# Patient Record
(demographics unavailable — no encounter records)

---

## 2024-10-31 NOTE — ASSESSMENT
[FreeTextEntry1] : Diagnosis BPH Retention  Plan Discussed finasteride vs surgery Plan for repeat PSA, MRI  Then return for discussion of outlet procedures  Kenneth Garcia MD, FACS, FRCS  of Urology [facility] Director of Laparoscopic and Robotic Surgery [facility] Director of Urology, [facility] Professor of Urology   (Office) [phone] (Cell)  [phone] [email or web address]  The total amount of time I have personally spent preparing for this visit, reviewing the patient's test results, obtaining external history, ordering tests/medications, documenting clinical information, communicating with and counseling the patient/family and/or caregiver(s), and spent face to face with the patient explaining the above was minutes =40

## 2024-10-31 NOTE — HISTORY OF PRESENT ILLNESS
[FreeTextEntry1] : CC: urinary retention  Patient had "routine testing" and found to have PSA 6.1 ng/ml, then US showed PVR of 610 (prevoid 1086, ).  Had a catheter fore 5 days then removed.   Now says stream good in morning, then slow, "not that strong".   UA negative  Cr 1.05  US demonstrated  and prostate volume 139, and mild bilateral hydronephrosis   PVR today between 200-300 cc

## 2024-11-07 NOTE — HISTORY OF PRESENT ILLNESS
[FreeTextEntry1] : seen 10/31 by LR for urinary retention and elev psa psa was 6.1. lr repeated 6.1 had cath x 5 days then removed. strem good then weakens thorught the day cr 1.05 us demonstratd pvr 610 and volume 139cc with mild bilatreal hydro pvr on 10/31 was 200-300  they dsicuss fiansteride vs surgery repeated psa- confirmed mri returns to discuss outlet procedures   on meds fo rprostate? mri read= psad= prior bx fh pvr today 4cc sono to reeval hydro options= simple, holep, pae

## 2024-11-14 NOTE — HISTORY OF PRESENT ILLNESS
[facility]   Advanced Heart Failure Clinic Note    Date of visit: 12/6/2023  Patient Name: Xu Flores  Medical Record Number: [id number]  YOB: 1941   Primary Physician: Richard Angeles MD.     SUBJECTIVE  Xu Flores is a 82 year old woman with PMHx of  heart failure with reduced ejection fraction (ischemic cardiomyopathy, LVEF 30% s/p ICD) w/ most recent EF 42%, severe MR (improved to M-M), HTN, CAD s/p PCI of the LAD (x2) and LPL (x1), type II DM, CKD, history of gastric ulcers and GI bleed, MALT lymphoma, obstructive sleep apnea not on CPAP, morbid obesity, depression and anxiety, CardioMEMS implant on 5/5/2022 for optimization of therapy, who presents today for follow-up.    She is accompanied today.     Xu Flores is doing alright today and reports some LE edema.    She denies any chest pain, chest pressure, shortness of breath, orthopnea, paroxysmal nocturnal dyspnea.    Pt denies dizziness, lightheadedness, and weight gain. No claudication, presyncope, syncope, changes in appetite, early satiety, nausea/emesis, or abdominal bloating.     No recent ER visits or hospitalizations.      Functional status is not significantly changed from prior visit.    She is adherent with medications and denies any side effects.     She lives at home.    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    Localized swelling, mass and lump, neck 12/06/2018     Malignant neoplasm (CMD)     right breast CA    Oral thrush 08/18/2023    Pain of right hip 06/13/2023    Presence of biventricular automatic cardioverter/defibrillator (AICD)     PUD (peptic ulcer disease)     Severe mitral regurgitation 02/23/2022    Sleep apnea     no cpap       Past Surgical History:  Past Surgical History:   Procedure Laterality Date    Breast lumpectomy Right     Cataract extraction, bilateral      Cholecystectomy      Icd implant      Ptca      4 vessels    Total hip replacement Right 06/09/2023    with I & D, scar revision 07/24/2023    Total knee replacement Right     Tubal ligation         Past Family History:  Family History   Problem Relation Age of Onset    Diabetes Mother     Diabetes Father        Past Social History:  Social History     Tobacco Use    Smoking status: Never     Passive exposure: Never    Smokeless tobacco: Never   Vaping Use    Vaping Use: never used   Substance Use Topics    Alcohol use: Never    Drug use: Never       Medications:  No outpatient medications have been marked as taking for the 12/6/23 encounter (Appointment) with Carter Murillo MD.       Allergies:  ALLERGIES:  No Known Allergies    General Review of Systems:    Constitutional: No fatigue. No unexpected weight change.   HENT: No nosebleeds.    Respiratory: As above  Cardiovascular: As above  Gastrointestinal: No for abdominal distention. No abdominal pain.   Endocrine: No for polyuria.   Genitourinary: No for hematuria.   Musculoskeletal: No myalgias.   Skin: No pallor.   Neurological: No syncope. No light-headedness.   Hematological: Does not bruise/bleed easily.   Otherwise complete ROS is negative.     OBJECTIVE  Vitals:    There were no vitals taken for this visit.     Wt Readings from Last 4 Encounters:   12/06/23 85.1 kg (187 lb 9.8 oz)   11/15/23 83.5 kg (184 lb)   11/15/23 83.2 kg (183 lb 6.8 oz)   10/11/23 84.7 kg (186 lb 11.7 oz)       Devices:  BIV (biventricular) - ICD and  CardioMEMs    Physical Exam:  Constitutional: 82 year old woman is in no acute distress.  Skin: Warm, dry, intact, no lesions.  HEENT: Normocephalic, atraumatic. Oral mucous membranes moist, EOMs (extraocular movements) intact.  Neck: Supple, trachea midline, JVP is normal, negative HJR (hepatojugular reflux).  Cardiovascular: Normal S1, S2. regular rhythm. Murmur: none. negative S3 and S4.  Respiratory: Clear  to auscultation bilaterally.   Abdomen: negative distension. Soft, nontender, negative hepatomegaly and normal bowel sounds.  Musculoskeletal/Extremities: CRT (capillary refill time is <3). No clubbing, no cyanosis. Edema: minimal edema.  Neurological: No focal neurological deficits and speech normal. Sensation grossly intact.  Psychiatric: Alert and oriented to person, place and time.    Labs:  Lab Results   Component Value Date    POTASSIUM 4.8 08/31/2023    SODIUM 138 08/31/2023    CO2 31 08/31/2023    CHLORIDE 101 08/31/2023    BUN 46 (H) 08/31/2023    CREATININE 1.35 (H) 08/31/2023    GLUCOSE 86 08/31/2023    CALCIUM 9.4 08/31/2023    NTPROB 1,100 (H) 03/03/2023     Lab Results   Component Value Date    WBC 11.0 08/31/2023    HCT 31.9 (L) 08/31/2023    HGB 9.4 (L) 08/31/2023     08/31/2023     Lab Results   Component Value Date    NTPROB 1,100 (H) 03/03/2023    NTPROB 704 (H) 01/05/2023     Lab Results   Component Value Date    CREATININE 1.35 (H) 08/31/2023    CREATININE 1.5 (H) 08/07/2023   ''    I personally reviewed these labs above.    Diagnostic Test:  Encounter Date: 08/11/23   Electrocardiogram 12-Lead   Result Value    Ventricular Rate EKG/Min (BPM) 66    Atrial Rate (BPM) 66    ND-Interval (MSEC) 216    QRS-Interval (MSEC) 160    QT-Interval (MSEC) 454    QTc 476    P Axis (Degrees) 36    R Axis (Degrees) -41    T Axis (Degrees) -33    REPORT TEXT      Sinus rhythm  with sinus arrhythmia  with 1st degree AV block  Left axis deviation  Left bundle branch block  Abnormal ECG  When compared  [FreeTextEntry1] : seen 10/31 by LR for urinary retention and elev psa psa was 6.1. lr repeated 6.1 had cath x 5 days then removed. strem good then weakens thorught the day cr 1.05 us demonstratd pvr 610 and volume 139cc with mild bilatreal hydro pvr on 10/31 was 200-300  they dsicuss fiansteride vs surgery repeated psa- confirmed mri returns to discuss outlet procedures   on meds fo rprostate? mri read= psad= prior bx fh pvr today 4cc sono to reeval hydro options= simple, holep, pae  ************* 11/13/24: 73 yo male referred for prostate biopsy for recent MRI showing two PIARDS 4 lesions in the setting for elevated PSA.  He has a hx of BPH with urinary retention, but his OVRs more recently have been minimal.   MRI prostate (11/5/24): 112 cc prostate (PSAD = 0.06) with two PIRADs 4 lesions; right mid PZPM, and left mid PZPM PSA (10/31/24) = 6.16  with ECG of  2023 04:19,  MD interval  has increased  QRS duration  has increased  Nonspecific T wave abnormality no longer evident in  Anterior leads  Confirmed by ISSAC MCGHEE [facility] ([id number]) on 2023 4:01:20 PM         Echocardiogram:  Results for orders placed during the hospital encounter of 23    TRANSTHORACIC ECHO (TTE) COMPLETE W/ W/O IMAGING AGENT    Narrative  *[facility]*  [address] [place] [id number]  Phone [phone]  Fax [phone]  Transthoracic Echocardiogram (TTE)    Patient: Xu Flores     Study Date/Time:         2023 9:29AM  MRN:     [id number]            FIN#:                    [id number]  :     1941         Ht/Wt:                   147cm 94.4kg  Age:     81                 BSA/BMI:                 2.02m^2 43.7kg/m^2  Gender:  F                  Baseline BP:             158 / 79  Ordering Physician:   Hernan Rowan    Referring Physician:  Hernan Rowan Gabriel Steven    Attending Physician:  Josh Thibodeaux  Performing Physician: Dennis Zamora MD  Diagnostic Physician: Dennis Zamora MD  Sonographer:          ADILIA Arvizu    ------------------------------------------------------------------------------  INDICATIONS:  Troponin i increased in blood specimen.    ------------------------------------------------------------------------------  STUDY CONCLUSIONS  SUMMARY:    1. Left ventricle: The cavity size is normal. Wall thickness is normal.  Systolic function is reduced. The ejection fraction was measured by 3D  assessment. The ejection fraction is 48%.  2. Right ventricle: The cavity size is normal. Systolic function is normal.  Pacemaker lead noted in right ventricle.  3. Right atrium: Pacemaker lead noted in right atrium.  IMPRESSIONS:  Overall unremarkable study. When compared to her prior Echo on  2022 there is slight improvement in overall LV systolic function. The  estimated  pulmonary systiolic pressures are slightly higher. There is no  evidence of high LV filling pressures.    ------------------------------------------------------------------------------  STUDY DATA:   Procedure:  A transthoracic echocardiogram was performed. Image  quality was adequate. The study was technically limited. Intravenous contrast  (Definity 2ml) was administered to opacify the left ventricle.  M-mode,  complete 2D, 3D, complete spectral Doppler, color Doppler, and intravenous  contrast injection.  Study status:  Routine.  Study completion:  There were no  complications.    FINDINGS    LEFT VENTRICLE:  3D imaging and post-processing assessment performed. Well  visualized. The cavity size is normal. Wall thickness is normal. There is no  evidence of hypertrophy. Systolic function is reduced.    The ejection  fraction was measured by 3D assessment. The ejection fraction is 48%. The  tissue Doppler parameters are abnormal. Diastolic dysfunction present but  unable to assess severity. There is no evidence of elevated ventricular  filling pressure by Doppler parameters.    AORTIC VALVE:  Poorly visualized. The annulus is mildly to moderately  calcified. The valve is probably trileaflet. The leaflets are mildly thickened  and mildly calcified. Cusp separation is normal. Mobility is not restricted.  Velocity is minimally increased. There is no stenosis. The peak systolic  gradient is 15mm Hg. The ratio of LVOT to aortic valve peak velocity is 0.45.  The valve area is 1.0cm^2. The valve area index is 0.5cm^2/m^2.    AORTA:  Aortic root: The root is normal-sized.  Ascending aorta: The vessel is normal-sized.  Descending aorta: The vessel is normal-sized.    MITRAL VALVE:  Well visualized. The annulus is mildly to moderately calcified.  The leaflets are normal thickness. Leaflet separation is normal. Mobility is  not restricted. No evidence for prolapse. There is nothing to suggest chordal  rupture or a flail  leaflet. Inflow velocity is within the normal range. There  is no evidence for stenosis. There is mild regurgitation. The mean diastolic  gradient is 3mm Hg. The peak diastolic gradient is 3mm Hg. The valve area by  pressure half-time is 3.3cm^2. The valve area index by pressure half-time is  1.65cm^2/m^2. The valve area (LVOT continuity) is 1.1cm^2. The valve area  index (LVOT continuity) is 0.56cm^2/m^2.    ATRIAL SEPTUM:  Well visualized. The septum is normal.  Color doppler shows no  obvious shunt.    LEFT ATRIUM:  Well visualized. The atrium is normal in size.    RIGHT VENTRICLE:  The cavity size is normal. Systolic function is normal. The  TAPSE is normal, suggestive of normal RV systolic function.  Systolic pressure  is increased. The estimated peak pressure is 35mm Hg. Pacemaker lead noted in  right ventricle.       The RV pressure during systole is 36mm Hg.    VENTRICULAR SEPTUM:   Well visualized. Thickness is normal.  Abnormal septal  motion. There is \"septal bounce\".    PULMONIC VALVE:   Not well visualized. There is no significant regurgitation.  The peak systolic gradient is 3mm Hg.    TRICUSPID VALVE:  Well visualized. There is no significant regurgitation.    RIGHT ATRIUM:  Well visualized. The atrium is normal in size. Pacemaker lead  noted in right atrium.       The estimated central venous pressure is 3mm Hg.    PERICARDIUM:  The pericardium is normal in appearance.  There is no  pericardial effusion.   No evidence of pleural fluid accumulation.    SYSTEMIC VEINS:  Inferior vena cava: The IVC is dilated.  Respirophasic diameter changes are in  the normal range (>= 50%).    ------------------------------------------------------------------------------  Measurements    Left ventricle            Value             Ref       02/28/2022  Left atrium continued      Value          Ref       02/28/2022  BEN, LAX chord        (H) 5.9   cm          3.8 - 5.2 ----------  Area/bsa ES, A2C           10.03  cm^2/m^2 --------- ----------  ESD, LAX chord        (H) 5.2   cm          2.2 - 3.5 ----------  Vol/bsa, S             (N) 23    ml/m^2   16 - 34   ----------  BEN/bsa, LAX chord    (N) 2.9   cm/m^2      2.3 - 3.1 ----------  Vol, ES, 1-p A4C       (N) 35    ml       22 - 52   42  ESD/bsa, LAX chord    (H) 2.6   cm/m^2      1.3 - 2.1 ----------  Vol/bsa, ES, 1-p A4C   (N) 17    ml/m^2   11 - 40   24  PW, ED, LAX           (N) 0.7   cm          0.6 - 0.9 ----------  Vol, ES, 1-p A2C       (H) 61    ml       22 - 52   68  BEN major ax, A4C         9.5   cm          --------- ----------  Vol/bsa, ES, 1-p A2C   (N) 30    ml/m^2   13 - 40   38  ESD major ax, A4C         8.7   cm          --------- ----------  Vol, ES, 2-p               49    ml       --------- 57  FS major axis, A4C        8     %           --------- ----------  Vol/bsa, ES, 2-p       (N) 24    ml/m^2   16 - 34   32  BEN/bsa major ax, A4C     4.7   cm/m^2      --------- ----------  ESD/bsa major ax, A4C     4.3   cm/m^2      --------- ----------  Aortic valve               Value          Ref       02/28/2022  ANDREW, A4C                  45.0  cm^2        --------- ----------  Peak v, S                  1.9   m/sec    --------- ----------  PRISCILA, A4C                  33.7  cm^2        --------- ----------  Peak grad, S               15    mm Hg    --------- ----------  FAC, A4C                  25    %           --------- ----------  LVOT/AV, Vpeak ratio       0.45           --------- ----------  IVS, ED               (N) 0.7   cm          0.6 - 0.9 ----------  JENNY, Vmax                  1.0   cm^2     --------- ----------  PW, ED                (N) 0.7   cm          0.6 - 0.9 ----------  JENNY/bsa, Vmax              0.5   cm^2/m^2 --------- ----------  IVS/PW, ED                0.91              --------- ----------  EDV                   (H) 202   ml          46 - 106  ----------  Mitral valve               Value          Ref       02/28/2022  ESV                    (H) 138   ml          14 - 42   ----------  Mean v, D                  0.7   m/sec    --------- ----------  EF                    (L) 48    %           54 - 74   42          Peak E                     0.89  m/sec    --------- 0.8  SV                        43    ml          --------- 53          Peak A                     1.23  m/sec    --------- 1.1  EDV/bsa               (H) 100   ml/m^2      29 - 61   ----------  VTI leaflet coapt          34.8  cm       --------- ----------  ESV/bsa               (H) 68    ml/m^2      8 - 24    ----------  MiV/LVOT VTI               2.0            --------- ----------  SV/bsa                    21    ml/m^2      --------- 30          Decel time                 225   ms       --------- 209  SV, 1-p A4C               60    ml          --------- ----------  PHT                        66    ms       --------- ----------  SV/bsa, 1-p A4C           30    ml/m^2      --------- ----------  Mean grad, D               3     mm Hg    --------- ----------  EDV, 2-p              (H) 171   ml          46 - 106  ----------  Peak grad, D               3     mm Hg    --------- 3  ESV, 2-p              (H) 114   ml          14 - 42   ----------  Peak E/A ratio             0.7            --------- ----------  SV, 2-p                   57    ml          --------- ----------  A-VTI                      24.4  cm       --------- ----------  EDV/bsa, 2-p          (H) 85    ml/m^2      29 - 61   ----------  MVA, PHT                   3.3   cm^2     --------- ----------  ESV/bsa, 2-p          (H) 56    ml/m^2      8 - 24    ----------  MVA/bsa, PHT               1.65  cm^2/m^2 --------- ----------  SV/bsa, 2-p               28.2  ml/m^2      --------- ----------  MVA, LVOT cont             1.1   cm^2     --------- ----------  E', lat crispin, TDI      (L) 6.2   cm/sec      >=10.0    ----------  MVA/bsa, LVOT cont         0.56  cm^2/m^2 --------- ----------  E/e', lat crispin, TDI    (H) 14                 <=13      ----------  E', med crispin, TDI      (N) 8.6   cm/sec      >=7.0     ----------  Pulmonic valve             Value          Ref       02/28/2022  E/e', med crispin, TDI        10                --------- ----------  Peak v, S                  0.9   m/sec    --------- 0.8  E', avg, TDI              7.395 cm/sec      --------- ----------  Peak grad, S               3     mm Hg    --------- 2  E/e', avg, TDI        (N) 12                <=14      ----------  Tricuspid valve            Value          Ref       02/28/2022  LVOT                      Value             Ref       02/28/2022  TR peak v              (H) 2.9   m/sec    <=2.8     2.4  Diam, S                   1.7   cm          --------- ----------  Peak RV-RA grad, S         33    mm Hg    --------- 23  Area                      2.3   cm^2        --------- 2.8  Peak julianne, S               0.87  m/sec       --------- 1           Aortic root                Value          Ref       02/28/2022  Peak grad, S              3     mm Hg       --------- 4           Root diam, ED          (L) 1.9   cm       2.7 - 4.1 ----------  Qs                        2.5   L/min       --------- ----------  S-T junct diam, ED     (L) 1.7   cm       2.0 - 3.2 ----------  Qs/bsa                    1.2   L/(min-m^2) --------- ----------  S-T junct diam/bsa, ED (L) 0.8   cm/m^2   1.1 - 1.9 ----------    Right ventricle           Value             Ref       02/28/2022  Ascending aorta            Value          Ref       02/28/2022  TAPSE, MM             (N) 2.1   cm          >=1.7     1.8         AAo AP diam, ED        (N) 2.3   cm       1.9 - 3.5 ----------  Pressure, S               36    mm Hg       --------- 26          AAo AP diam/bsa, ED    (N) 1.1   cm/m^2   1.0 - 2.2 ----------    Left atrium               Value             Ref       02/28/2022  Pulmonary artery           Value          Ref       02/28/2022  AP dim, ES            (H) 4.0   cm          2.7 - 3.8 ----------  Pressure,  S                33    mm Hg    --------- ----------  AP dim index, ES      (N) 2.0   cm/m^2      1.5 - 2.3 ----------  Area ES, A4C          (N) 15    cm^2        <=20      16          Systemic veins             Value          Ref       02/28/2022  Area/bsa ES, A4C          7.26  cm^2/m^2    --------- ----------  Estimated CVP              3     mm Hg    --------- 3  Area ES, A2C              20    cm^2        --------- 21  Legend:  (L)  and  (H)  renetta values outside specified reference range.    (N)  marks values inside specified reference range.    Prepared and electronically signed by  Dennis Zamora MD  07/27/2023 13:24    Prior Signatures:    Preliminary Reviewed by Jermaine Higgins - 7/27/2023 12:18:26 PM       Cath Report:  Results for orders placed or performed during the hospital encounter of 05/05/22   Cath/PV Case    Narrative    Plan and Recommendations:  Routine post cath care.  Continue current medications.  Euvolemic at this point    Procedure done collaboratively between Dr. Olu Cavazos and Dr. Sarabjit Murillo.    Carter Murillo MD  Advanced Heart Failure  [facility]         I personally reviewed the above diagnostic tests.    ASSESSMENT/PLAN  82 year old woman with PMHx of heart failure with reduced ejection fraction (ischemic cardiomyopathy, LVEF 30% s/p ICD) w/ most recent EF 42%, severe MR (improved to M-M), HTN, CAD s/p PCI of the LAD (x2) and LPL (x1), type II DM, CKD, history of gastric ulcers and GI bleed, MALT lymphoma, obstructive sleep apnea not on CPAP, morbid obesity, depression and anxiety, CardioMEMS implant on 5/5/2022 for optimization of therapy, who presents today for follow-up.     Impression:  -From cardiac standpoint patient is stable and has no signs of decompensation.     Problem List Items Addressed This Visit    None      Heart Failure  - ACC/ AHA Stage C: Structural heart disease with prior or current symptoms of heart failure, New York Heart  Association Classification: NYHA Class I: No HF symptoms/activity intolerance (Able to do heavy yardwork, can walk up steep hills)  - Etiology: Chronic Compensated HFrEF, ischemic cardiomyopathy.    - LVEF:   Ejection Fraction   Date Value Ref Range Status   07/27/2023 48%  Corrected       Lab Results   Component Value Date    NTPROB 1,100 (H) 03/03/2023    NTPROB 704 (H) 01/05/2023     - Volume status is normal .   - Perfusion status is normal.   - Most recent labs are Reviewed.    Current GDMT:  ARNI/ARB/ACEI: No. Pt stopped lisinopril     Beta Blocker: Yes.   SGLT-2 Inhibitor: No. Pt stopped Jardiance.    MRA (Spironolactone or Eplerenone): No. Pt stopped spironolactone.    Hydralazine and isosorbide dinitrate therapy for HFrEF in those patients self-identified as Black or : No. N/A      Based on objective data and clinical data will augment medical therapy as follows:    Optimized GDMT (Guideline-directed medical therapy):   -Continue metoprolol succinate 200 mg daily   -Continue Imdur 30 mg daily   -Continue torsemide 60 mg twice daily   -Continue nitroglycerin 0.4 mg prn     Coronary Artery Disease s/p PCI  - Continue atorvastatin 80 mg daily     Atrial Fibrillation  - CHADSVASc Stroke Risk Score = 7  Lab Results   Component Value Date    TSH 4.566 05/17/2023     - Anticoagulation (DOAC) for Atrial flutter or atrial fibrillation: No   - Continue antiarrhythmics  - Continue anticoagulant: aspirin 81 mg daily     Pt has stopped taking Xarelto 10 mg daily.    Hypertension  - BP controlled  - See GDMT above  - Monitor blood pressure (Goal 120/80)   - SPRINT found that the lower target (less than 120 mm Hg) reduced cardiovascular events by 25 percent and reduced the overall risk of death by 27 percent.    Hyperlipidemia  Lab Results   Component Value Date    CHOLESTEROL 124 06/22/2022    TRIGLYCERIDE 133 06/22/2022    HDL 45 (L) 06/22/2022    CALCLDL 52 06/22/2022     - Continue Statin    CKD  Lab  Results   Component Value Date    CREATININE 1.35 (H) 08/31/2023     Glomerular Filtration Rate (no units)   Date Value   08/31/2023 39 (L)       DM  Hemoglobin A1C (%)   Date Value   07/27/2023 8.3 (H)       Consideration of ICD ( for primary prevention of sudden cardiac death, LVEF ? 35%):  ICD in place  Consideration of  CRT-D or CRT-P (LVEF ? 35% with QRS >150 and LBBB) :  ICD in place     If moderate-to-severe or severe MR, has patient been referred to valve clinic?: N/A    Dietary Guidelines discussed with the patient, instructed to consume < 2.5 g Na per day. I have reviewed/discussed fluid intake, daily measurement of weight recommendations. I also included education on the patient's disease process including a discussion on the physiology of heart failure and progression of disease.  Heart failure booklet provided and reviewed all signs and symptoms of CHF and when/why to contact the heart failure clinic.       Follow-up: March     - I spent a total of 10 minutes on the day of the visit.     - Time was spent on pre-charting, full patient visit and history obtained, physical exam obtained, counseling provided, note written and coordination of care. At least 10 minutes were spent on counseling and coordination of care face to face.     - Patient's chart was thoroughly reviewed before and after being seen in office.     - Patient left the office in no acute distress and understanding the plan of care. All questions were answered to satisfaction. Patient was encouraged to call, or message directly though the patient portal with any questions or concerns, and return to the clinic as needed.      - The Cures Act requires that medical notes be available to patients in the interest of transparency; however, be advised this is a medical document that is intended as peer to peer communication. It is written in medical language and may contain abbreviations or verbage that are unfamiliar. It may appear direct as  medical documents are intended to carry relevant information, facts as evident, and the clinical opinion of the practitioner.     - Understanding and agreement was voiced with all above plans.  This document  was partially dictated using voice recognition software. Rapid proofreading was performed to expedite delivery of information. Phonetic errors will occur, which are common with voice recognition software. If there is concern about meaning or content, please contact the office directly.     - Other errors in documentation including omission of facts or data may occur due to the recommended timeframe in which documentation must be written and signed as recommended by the governing institution. Accordingly, the physician has made attempts at writing an inclusive note but errors may occur and may need to be corrected after the note is signed and placed in electronic record.    - Patient understands they can return sooner if any issues such as orthopnea, dyspnea on exertion, paroxysmal nocturnal dyspnea, dizziness, lightheadedness or angina should arise. If the worsening symptoms or concerns are great, they should contact the office or if unable to, seek care in the emergency department.    On 12/6/2023, Melina LOUISE scribed the services personally performed by Dr. Murillo.    I have reviewed and revised the note above. The documentation recorded by the scribe accurately reflects history obtained, actions preformed and decisions made by me.     Carter Murillo MD  Advanced Heart Failure  [facility]

## 2024-11-14 NOTE — ASSESSMENT
[FreeTextEntry1] : 73 yo male with elevated PSA and PIARDS 4 lesions on MRI.  I recommend we proceed with TP targeted fusion prostate biopsy. . Risks of prostate biopsy were discussed including, but not limited to, bleeding, hematuria, infection, abscess, sepsis, rectal injury, urethral injury, or bladder injury, irritative or obstructive urinary symptoms, urinary retention, chronic pain, as well as the potential need for additional procedures.  The patient asked questions and they were answered. The patient elects for prostate biopsy.  Plan: 1. Schedule TP biopsy 2. PST 3, Medical clearance 4. He will continue his current BPH regimen of tamsulosin

## 2024-11-14 NOTE — ASSESSMENT
[FreeTextEntry1] : 75 yo male with elevated PSA and PIARDS 4 lesions on MRI.  I recommend we proceed with TP targeted fusion prostate biopsy. . Risks of prostate biopsy were discussed including, but not limited to, bleeding, hematuria, infection, abscess, sepsis, rectal injury, urethral injury, or bladder injury, irritative or obstructive urinary symptoms, urinary retention, chronic pain, as well as the potential need for additional procedures.  The patient asked questions and they were answered. The patient elects for prostate biopsy.  Plan: 1. Schedule TP biopsy 2. PST 3, Medical clearance 4. He will continue his current BPH regimen of tamsulosin

## 2024-11-14 NOTE — HISTORY OF PRESENT ILLNESS
[FreeTextEntry1] : seen 10/31 by LR for urinary retention and elev psa psa was 6.1. lr repeated 6.1 had cath x 5 days then removed. strem good then weakens thorught the day cr 1.05 us demonstratd pvr 610 and volume 139cc with mild bilatreal hydro pvr on 10/31 was 200-300  they dsicuss fiansteride vs surgery repeated psa- confirmed mri returns to discuss outlet procedures   on meds fo rprostate? mri read= psad= prior bx fh pvr today 4cc sono to reeval hydro options= simple, holep, pae  ************* 11/13/24: 75 yo male referred for prostate biopsy for recent MRI showing two PIARDS 4 lesions in the setting for elevated PSA.  He has a hx of BPH with urinary retention, but his OVRs more recently have been minimal.   MRI prostate (11/5/24): 112 cc prostate (PSAD = 0.06) with two PIRADs 4 lesions; right mid PZPM, and left mid PZPM PSA (10/31/24) = 6.16

## 2024-12-24 NOTE — HISTORY OF PRESENT ILLNESS
[FreeTextEntry1] : seen 10/31 by LR for urinary retention and elev psa psa was 6.1. lr repeated 6.1 had cath x 5 days then removed. strem good then weakens thorught the day cr 1.05 us demonstratd pvr 610 and volume 139cc with mild bilatreal hydro pvr on 10/31 was 200-300  they dsicuss fiansteride vs surgery repeated psa- confirmed mri returns to discuss outlet procedures   on meds fo rprostate? mri read= psad= prior bx fh pvr today 4cc sono to reeval hydro options= simple, holep, pae  ************* 11/13/24: 75 yo male referred for prostate biopsy for recent MRI showing two PIARDS 4 lesions in the setting for elevated PSA.  He has a hx of BPH with urinary retention, but his OVRs more recently have been minimal.   MRI prostate (11/5/24): 112 cc prostate (PSAD = 0.06) with two PIRADs 4 lesions; right mid PZPM, and left mid PZPM PSA (10/31/24) = 6.16  ************ 12/24/24: Patient returns for follow up after prostate biopsy.  Biopsy shows 3 cores of GG1 prostate ca up to 25% of the cores.  He went into urinary retention after the biopsy and was seen locally for vasquez removal.  He is still voiding with difficulty and feels that he may not be fully emptying the bladder.   PVR = 488 cc; repeat 549 cc, able to void again.

## 2024-12-24 NOTE — ASSESSMENT
[FreeTextEntry1] : 73 yo male with GG1 prostate cancer, BPH, and urinary retention.   The patient's records were reviewed. An extensive discussion was held with the patient and his wife. The biopsy pathology report, Yoana score, and Kristine nomograms were reviewed. The chance of organ confined disease, extracapsular extension, seminal vesicle involvement, and lymph node involvement were reviewed.  Options for prostate cancer therapy were discussed. Active surveillance, radical prostatectomy, radiation therapies, cryosurgery, HIFU (high intensity focused ultrasound therapy), hormone therapy, immunotherapy, chemotherapy, and combination/multimodal therapy were reviewed. The merits and limitations of the various options were discussed with the patient.   We also discuses his issue with BPH, LUTS and urinary retention.  He could consider AS with a bladder outlet procedure such as a HoLEP or simple prostatectomy.  He could also consider a RALP which would treat his cancer and eliminate his BPH issue.  I recommended against radiation therapy because of his large prostate and severe LUTS.  Radiation therapy will only make this worse.   He will follow up with Dr. Garcia to discuss surgical options for BPH and cancer.

## 2024-12-31 NOTE — HISTORY OF PRESENT ILLNESS
[FreeTextEntry1] : CC: Prostate Cancer, Incomplete bladder emptying  Underwent prostate biopsy with Dr. Reeves  Biopsy shows 3 cores of GG1 prostate ca up to 25% of the cores. He went into urinary retention after the biopsy and was seen locally for vasquez removal. PVR = 488 cc; repeat 549 cc on 12/24/24  PVR today was 409 cc   MRI prostate (11/5/24): 112 cc prostate (PSAD = 0.06) with two PIRADs 4 lesions; right mid PZPM, and left mid PZPM PSA (10/31/24) = 6.16  US today mild right fullness, no hydro bilat

## 2024-12-31 NOTE — ASSESSMENT
[FreeTextEntry1] : Diagnosis: BPh, retention, CAP   Long and detailed discussion with patient regarding RP vs. an outlet procedure followed by AS (or RT) All r/b/a and oncological outcomes reviewed  Regarding outlet, we discussed the natural history of bladder outlet obstruction, risks of progression, risks of detrusor myopathy/areflexic bladder, bladder stones, UTI, worsening symptoms, risk of retention and other issues.   All treatment options were reviewed.  This included surveillance, all medical therapeutic options, all outlet procedures including office based, TURP, bipolar TURP, button vaporization, Rezum, Aquablation, Urolift, thulium/holmium, suprapubic/retropubic simple (open, robotic) prostatectomy.     \All potential side effects of treatment were reviewed including, but not limited to: short term or permanent stress urinary incontinence and/or short term or permanent erectile dysfunction, rectal symptoms/pain, perineal pain, bleeding, rectal injury, recognized vs. unrecognized/delayed-recognition injury, TUR syndrome, risks of DVT, PE, MI, death, risks of cardiopulmonary/anesthesia related complications, positional injury, infection, ureteral injury/obstruction, bladder neck contracture, meatal stenosis, urethral stricture and other complications.   My personal and institutional experience reviewed, as well as literature regarding these options.   He and his wife elect simple prostatectomy/enucleation by robotic approach.  Impact on ejaculate reviewed.  US today personally reviewed and independently interpreted   Kenneth Garcia MD, FACS, CS  of Urology [facility] Director of Laparoscopic and Robotic Surgery [facility] Director of Urology, [facility] Professor of Urology   (Office) [phone] (Cell) [phone] [email or web address]  The total amount of time I have personally spent preparing for this visit, reviewing the patient's test results, obtaining external history, ordering tests/medications, documenting clinical information, communicating with and counseling the patient/family and/or caregiver(s), and spent face to face with the patient explaining the above was minutes =60

## 2025-01-13 NOTE — HISTORY OF PRESENT ILLNESS
[FreeTextEntry1] : 74 year old male presents to discuss HoLEP. Recently diagnosed with 3 cores of GG1 prostate cancer. He is experiencing acute on chronic urinary retention.  He is currently scheduled to undergo a robotic simple prostatectomy with Dr. Garcia, but is interested in HoLEP at recommendation of [facility].  He has baseline moderate LUTS, primarily obstructive symptoms. Takes flomax 0.4 mg daily.   Prior PVRs have been elevated, 549 cc on 12/24/24, 409 cc on 12/31/24.   MRI prostate (11/5/24): 112 cc prostate (PSAD = 0.06) with two PIRADs 4 lesions; right mid PZPM, and left mid PZPM PSA (10/31/24) = 6.16  Prostate biopsy 12/21/24: GG1 cancer in 3 cores including left PZpm mid target.  US today mild right fullness, no hydro bilat

## 2025-01-13 NOTE — ASSESSMENT
[FreeTextEntry1] : 74 year old man with GG 1 prostate cancer diagnosed 12/2024, electing for active surveillance, BPH with 112 cc prostate, moderate LUTS with chronic retention. Interested in prostate debulking procedure to help with LUTS and bladder emptying.  I made it clear that because of his large prostate size > 80 grams, AUA recommendation is to perform a simple prostatectomy due to the ability to obtain effective and durable improvement in voiding symptoms. A simple prostatectomy can be performed via an open approach, laparoscopic approach or transurethral laser enucleation approach. We discussed the risks and benefits of each approach. Overall, the long term outcomes are similar.   Mr. MCCOY decided to proceed with laser enucleation of prostate followed by prostate morcellation. Therefore, I spent a good amount of time discussing the risks and benefits of this procedure. We discussed potential risks of incontinence, retrograde ejaculation and infertility, erectile dysfunction, irritative voiding symptoms, injury to the urethra and bladder, rare need to convert to an open surgery.   - OR for HoLEP at [facility]  - Pre-op medical clearance

## 2025-01-13 NOTE — ASSESSMENT
[FreeTextEntry1] : 74 year old man with GG 1 prostate cancer diagnosed 12/2024, electing for active surveillance, BPH with 112 cc prostate, moderate LUTS with chronic retention. Interested in prostate debulking procedure to help with LUTS and bladder emptying.  I made it clear that because of his large prostate size > 80 grams, AUA recommendation is to perform a simple prostatectomy due to the ability to obtain effective and durable improvement in voiding symptoms. A simple prostatectomy can be performed via an open approach, laparoscopic approach or transurethral laser enucleation approach. We discussed the risks and benefits of each approach. Overall, the long term outcomes are similar.   Mr. MCCOY decided to proceed with laser enucleation of prostate followed by prostate morcellation. Therefore, I spent a good amount of time discussing the risks and benefits of this procedure. We discussed potential risks of incontinence, retrograde ejaculation and infertility, erectile dysfunction, irritative voiding symptoms, injury to the urethra and bladder, rare need to convert to an open surgery.   - OR for HoLEP at [facility]  - Pre-op medical clearance

## 2025-01-13 NOTE — HISTORY OF PRESENT ILLNESS
[FreeTextEntry1] : 74 year old male presents to discuss HoLEP. Recently diagnosed with 3 cores of GG1 prostate cancer. He is experiencing acute on chronic urinary retention.  He is currently scheduled to undergo a robotic simple prostatectomy with Dr. Garcia, but is interested in HoLEP at recommendation of [facility].  He has baseline moderate LUTS, primarily obstructive symptoms. Takes flomax 0.4 mg daily.   Prior PVRs have been elevated, 549 cc on 12/24/24, 409 cc on 12/31/24.   MRI prostate (11/5/24): 112 cc prostate (PSAD = 0.06) with two PIRADs 4 lesions; right mid PZPM, and left mid PZPM PSA (10/31/24) = 6.16  Prostate biopsy 12/21/24: GG1 cancer in 3 cores including left PZpm mid target.  US today mild right fullness, no hydro bilat

## 2025-02-14 NOTE — HISTORY OF PRESENT ILLNESS
[FreeTextEntry1] : 1/13/25: 74 year old male presents to discuss HoLEP. Recently diagnosed with 3 cores of GG1 prostate cancer. He is experiencing acute on chronic urinary retention.  He is currently scheduled to undergo a robotic simple prostatectomy with Dr. Garcia, but is interested in HoLEP at recommendation of [facility].  He has baseline moderate LUTS, primarily obstructive symptoms. Takes flomax 0.4 mg daily.   Prior PVRs have been elevated, 549 cc on 12/24/24, 409 cc on 12/31/24.   MRI prostate (11/5/24): 112 cc prostate (PSAD = 0.06) with two PIRADs 4 lesions; right mid PZPM, and left mid PZPM PSA (10/31/24) = 6.16  Prostate biopsy 12/21/24: GG1 cancer in 3 cores including left PZpm mid target.  US today mild right fullness, no hydro bilat  2/6/25: S/P HoLEP. He had urethral stricture that was dilated with scope. Discharged with vasquez catheter.  2/11/25; Catheter was removed at home on POD 3 because it was clogged. After catheter removal, he passed two large clots. Since then, stream has been variable, sometimes strong, sometimes weak. No stress leakage, occasional urge incontinence. Frequency, dysuria. Hematuria resolved.   cc  2/14/25: Here for TOV. Went into retention on 2/12/25, catheter replaced in [facility] ED.    TOV: - 200 cc sterile water instilled via existing catheter - Catheter removed in its entirety - Patient voided several times over many hours, each void about 100-150 cc. Final  cc.

## 2025-02-14 NOTE — HISTORY OF PRESENT ILLNESS
[FreeTextEntry1] : 1/13/25: 74 year old male presents to discuss HoLEP. Recently diagnosed with 3 cores of GG1 prostate cancer. He is experiencing acute on chronic urinary retention.  He is currently scheduled to undergo a robotic simple prostatectomy with Dr. Garcia, but is interested in HoLEP at recommendation of [facility].  He has baseline moderate LUTS, primarily obstructive symptoms. Takes flomax 0.4 mg daily.   Prior PVRs have been elevated, 549 cc on 12/24/24, 409 cc on 12/31/24.   MRI prostate (11/5/24): 112 cc prostate (PSAD = 0.06) with two PIRADs 4 lesions; right mid PZPM, and left mid PZPM PSA (10/31/24) = 6.16  Prostate biopsy 12/21/24: GG1 cancer in 3 cores including left PZpm mid target.  US today mild right fullness, no hydro bilat  2/6/25: S/P HoLEP. He had urethral stricture that was dilated with scope. Discharged with vasquez catheter.  2/11/25; Catheter was removed at home on POD 3 because it was clogged. After catheter removal, he passed two large clots. Since then, stream has been variable, sometimes strong, sometimes weak. No stress leakage, occasional urge incontinence. Frequency, dysuria. Hematuria resolved.   cc  2/14/25: Here for TOV. Went into retention on 2/12/25, catheter replaced in [facility] ED.    TOV: - 200 cc sterile water instilled via existing catheter - Catheter removed in its entirety - Patient voided several times over many hours, each void about 100-150 cc. Final  cc.

## 2025-02-14 NOTE — ASSESSMENT
[FreeTextEntry1] : 74 year old man with GG 1 prostate cancer diagnosed 12/2024, electing for active surveillance, BPH with 112 cc prostate, moderate LUTS with chronic retention. S/P HoLEP 2/6/25. Found to have bulbar urethral stricture during the procedure. Catheter left in place for 3 days. Since catheter removal, voiding with variable stream. Was constipated, which may have been contributing. Likely some inflammation. PVR is stable from prior to procedure. He went into retention on 2/12/25, catheter replaced. TOV 2/14/25 was successful with improved PVR compared to prior.  - F/U in 2 weeks for UA, IPSS, PVR

## 2025-02-26 NOTE — ASSESSMENT
[FreeTextEntry1] : 74 year old man with GG 1 prostate cancer diagnosed 12/2024, electing for active surveillance, BPH with 112 cc prostate, moderate LUTS with chronic retention. S/P HoLEP 2/6/25. Found to have bulbar urethral stricture during the procedure. Catheter left in place for 3 days. Since catheter removal, voiding with variable stream. He went into retention on 2/12/25, catheter replaced. TOV 2/14/25 was successful with improved PVR compared to prior. Since then, again had variable stream with occasional inability to urinate.   Cystoscopy 2/26/25 demonstrated recurrence of bulbar urethral stricture, very soft, but very narrow. The cystoscope was advanced over a wire through the strictured area without any difficulty. Afterward, the area appeared more open. The prostate fossa is wide open with expected appearance after HoLEP. Patient voided several times without difficulty and with strong stream. Will monitor for now. If recurrent symptoms, will need dilation in the OR.  - F/U in 2 weeks for UA, IPSS, PVR

## 2025-02-26 NOTE — HISTORY OF PRESENT ILLNESS
[FreeTextEntry1] : 1/13/25: 74 year old male presents to discuss HoLEP. Recently diagnosed with 3 cores of GG1 prostate cancer. He is experiencing acute on chronic urinary retention.  He is currently scheduled to undergo a robotic simple prostatectomy with Dr. Garcia, but is interested in HoLEP at recommendation of [facility].  He has baseline moderate LUTS, primarily obstructive symptoms. Takes flomax 0.4 mg daily.   Prior PVRs have been elevated, 549 cc on 12/24/24, 409 cc on 12/31/24.   MRI prostate (11/5/24): 112 cc prostate (PSAD = 0.06) with two PIRADs 4 lesions; right mid PZPM, and left mid PZPM PSA (10/31/24) = 6.16  Prostate biopsy 12/21/24: GG1 cancer in 3 cores including left PZpm mid target.  US today mild right fullness, no hydro bilat  2/6/25: S/P HoLEP. He had urethral stricture that was dilated with scope. Discharged with vasquez catheter.  2/11/25; Catheter was removed at home on POD 3 because it was clogged. After catheter removal, he passed two large clots. Since then, stream has been variable, sometimes strong, sometimes weak. No stress leakage, occasional urge incontinence. Frequency, dysuria. Hematuria resolved.   cc  2/14/25: Here for TOV. Went into retention on 2/12/25, catheter replaced in [facility] ED.    TOV: - 200 cc sterile water instilled via existing catheter - Catheter removed in its entirety - Patient voided several times over many hours, each void about 100-150 cc. Final  cc.  2/26/25: Has difficulty initiating stream with pain for a few hours each day. At other times stream is very strong with no difficulty. Intemittent hematuria. No leakage.  Cystoscopy today showed recurrence of bulbar urethral stricture, very soft, but very narrow. The cystoscope was advanced over a wire through the strictured area without any difficulty. Afterward, the area appeared more open. The prostate fossa is wide open with expected appearance after HoLEP. Patient voided several times without difficulty and with strong stream.

## 2025-03-05 NOTE — HISTORY OF PRESENT ILLNESS
[FreeTextEntry1] : 1/13/25: 74 year old male presents to discuss HoLEP. Recently diagnosed with 3 cores of GG1 prostate cancer. He is experiencing acute on chronic urinary retention.  He is currently scheduled to undergo a robotic simple prostatectomy with Dr. Garcia, but is interested in HoLEP at recommendation of [facility].  He has baseline moderate LUTS, primarily obstructive symptoms. Takes flomax 0.4 mg daily.   Prior PVRs have been elevated, 549 cc on 12/24/24, 409 cc on 12/31/24.   MRI prostate (11/5/24): 112 cc prostate (PSAD = 0.06) with two PIRADs 4 lesions; right mid PZPM, and left mid PZPM PSA (10/31/24) = 6.16  Prostate biopsy 12/21/24: GG1 cancer in 3 cores including left PZpm mid target.  US today mild right fullness, no hydro bilat  2/6/25: S/P HoLEP. He had urethral stricture that was dilated with scope. Discharged with vasquez catheter.  2/11/25; Catheter was removed at home on POD 3 because it was clogged. After catheter removal, he passed two large clots. Since then, stream has been variable, sometimes strong, sometimes weak. No stress leakage, occasional urge incontinence. Frequency, dysuria. Hematuria resolved.   cc  2/14/25: Here for TOV. Went into retention on 2/12/25, catheter replaced in [facility] ED.    TOV: - 200 cc sterile water instilled via existing catheter - Catheter removed in its entirety - Patient voided several times over many hours, each void about 100-150 cc. Final  cc.  2/26/25: Has difficulty initiating stream with pain for a few hours each day. At other times stream is very strong with no difficulty. Intemittent hematuria. No leakage.  Cystoscopy today showed recurrence of bulbar urethral stricture, very soft, but very narrow. The cystoscope was advanced over a wire through the strictured area without any difficulty. Afterward, the area appeared more open. The prostate fossa is wide open with expected appearance after HoLEP. Patient voided several times without difficulty and with strong stream.   3/5/25: Taking cipro for UTI. Initially symptoms improved with stronger flow. However, last day having more difficulty, dribbling of urine.   Cystoscopy with recurrent bulbar urethral stricture, similar to prior.    cc

## 2025-03-05 NOTE — PHYSICAL EXAM
[General Appearance - In No Acute Distress] : no acute distress [] : no respiratory distress [Urethral Meatus] : meatus normal [Oriented To Time, Place, And Person] : oriented to person, place, and time

## 2025-03-05 NOTE — ASSESSMENT
[FreeTextEntry1] : 74 year old man with GG 1 prostate cancer diagnosed 12/2024, electing for active surveillance, BPH with 112 cc prostate, moderate LUTS with chronic retention. S/P HoLEP 2/6/25. Found to have bulbar urethral stricture during the procedure. Catheter left in place for 3 days. Since catheter removal, voiding with variable stream. He went into retention on 2/12/25, catheter replaced. TOV 2/14/25 was successful with improved PVR compared to prior. Since then, again had variable stream with occasional inability to urinate.   Cystoscopy 2/26/25 demonstrated recurrence of bulbar urethral stricture, very soft, but very narrow. The cystoscope was advanced over a wire through the structured area without any difficulty. Afterward, the area appeared more open. The prostate fossa is wide open with expected appearance after HoLEP. Patient voided several times without difficulty and with strong stream. Recurrence of stricture seen on cystoscopy 3/5/25, dilated with cystoscope. Plan for more formal dilation in the operating room.   - OR for Optilume urethral dilationR

## 2025-03-10 NOTE — HISTORY OF PRESENT ILLNESS
We were happy to see you today      Please return to clinic in      3 months   
[FreeTextEntry1] : 1/13/25: 74 year old male presents to discuss HoLEP. Recently diagnosed with 3 cores of GG1 prostate cancer. He is experiencing acute on chronic urinary retention.  He is currently scheduled to undergo a robotic simple prostatectomy with Dr. Garcia, but is interested in HoLEP at recommendation of [facility].  He has baseline moderate LUTS, primarily obstructive symptoms. Takes flomax 0.4 mg daily.   Prior PVRs have been elevated, 549 cc on 12/24/24, 409 cc on 12/31/24.   MRI prostate (11/5/24): 112 cc prostate (PSAD = 0.06) with two PIRADs 4 lesions; right mid PZPM, and left mid PZPM PSA (10/31/24) = 6.16  Prostate biopsy 12/21/24: GG1 cancer in 3 cores including left PZpm mid target.  US today mild right fullness, no hydro bilat  2/6/25: S/P HoLEP. He had urethral stricture that was dilated with scope. Discharged with vasquez catheter.  2/11/25; Catheter was removed at home on POD 3 because it was clogged. After catheter removal, he passed two large clots. Since then, stream has been variable, sometimes strong, sometimes weak. No stress leakage, occasional urge incontinence. Frequency, dysuria. Hematuria resolved.   cc  2/14/25: Here for TOV. Went into retention on 2/12/25, catheter replaced in [facility] ED.    TOV: - 200 cc sterile water instilled via existing catheter - Catheter removed in its entirety - Patient voided several times over many hours, each void about 100-150 cc. Final  cc.  2/26/25: Has difficulty initiating stream with pain for a few hours each day. At other times stream is very strong with no difficulty. Intemittent hematuria. No leakage.  Cystoscopy today showed recurrence of bulbar urethral stricture, very soft, but very narrow. The cystoscope was advanced over a wire through the strictured area without any difficulty. Afterward, the area appeared more open. The prostate fossa is wide open with expected appearance after HoLEP. Patient voided several times without difficulty and with strong stream.   3/5/25: Taking cipro for UTI. Initially symptoms improved with stronger flow. However, last day having more difficulty, dribbling of urine.   Cystoscopy with recurrent bulbar urethral stricture, similar to prior.    cc  3/6/25: S/P urethral dilation with optilume balloon.  3/10/25: Presents for TOV, which was successful.

## 2025-03-10 NOTE — ASSESSMENT
[FreeTextEntry1] : 74 year old man with GG 1 prostate cancer diagnosed 12/2024, electing for active surveillance, BPH with 112 cc prostate, moderate LUTS with chronic retention. S/P HoLEP 2/6/25. Found to have bulbar urethral stricture during the procedure. Catheter left in place for 3 days. Since catheter removal, voiding with variable stream. He went into retention on 2/12/25, catheter replaced. TOV 2/14/25 was successful with improved PVR compared to prior. Since then, again had variable stream with occasional inability to urinate.   Cystoscopy 2/26/25 demonstrated recurrence of bulbar urethral stricture, very soft, but very narrow. The cystoscope was advanced over a wire through the structured area without any difficulty. Afterward, the area appeared more open. The prostate fossa is wide open with expected appearance after HoLEP. Patient voided several times without difficulty and with strong stream. Recurrence of stricture seen on cystoscopy 3/5/25, dilated with cystoscope. Plan for more formal dilation in the operating room. S/P urethral dilation with optilume balloon 3/6/25. Discharged with vasquez catheter. TOV in office 3/10/25 successful.  - F/U in 2 weeks for UA, IPSS, PVR

## 2025-04-02 NOTE — HISTORY OF PRESENT ILLNESS
[FreeTextEntry1] : 1/13/25: 74 year old male presents to discuss HoLEP. Recently diagnosed with 3 cores of GG1 prostate cancer. He is experiencing acute on chronic urinary retention. He is currently scheduled to undergo a robotic simple prostatectomy with Dr. Garcia, but is interested in HoLEP at recommendation of [facility].  He has baseline moderate LUTS, primarily obstructive symptoms. Takes flomax 0.4 mg daily.  Prior PVRs have been elevated, 549 cc on 12/24/24, 409 cc on 12/31/24.  MRI prostate (11/5/24): 112 cc prostate (PSAD = 0.06) with two PIRADs 4 lesions; right mid PZPM, and left mid PZPM PSA (10/31/24) = 6.16  Prostate biopsy 12/21/24: GG1 cancer in 3 cores including left PZpm mid target.  US today mild right fullness, no hydro bilat  2/6/25: S/P HoLEP. He had urethral stricture that was dilated with scope. Discharged with vasquez catheter.  2/11/25; Catheter was removed at home on POD 3 because it was clogged. After catheter removal, he passed two large clots. Since then, stream has been variable, sometimes strong, sometimes weak. No stress leakage, occasional urge incontinence. Frequency, dysuria. Hematuria resolved.   cc  2/14/25: Here for TOV. Went into retention on 2/12/25, catheter replaced in [facility] ED.  TOV: - 200 cc sterile water instilled via existing catheter - Catheter removed in its entirety - Patient voided several times over many hours, each void about 100-150 cc. Final  cc.  2/26/25: Has difficulty initiating stream with pain for a few hours each day. At other times stream is very strong with no difficulty. Intemittent hematuria. No leakage.  Cystoscopy today showed recurrence of bulbar urethral stricture, very soft, but very narrow. The cystoscope was advanced over a wire through the strictured area without any difficulty. Afterward, the area appeared more open. The prostate fossa is wide open with expected appearance after HoLEP. Patient voided several times without difficulty and with strong stream.  3/5/25: Taking cipro for UTI. Initially symptoms improved with stronger flow. However, last day having more difficulty, dribbling of urine.  Cystoscopy with recurrent bulbar urethral stricture, similar to prior.   cc  3/6/25: S/P urethral dilation with optilume balloon.  3/10/25: Presents for TOV, which was successful.  4/2/25: Presents to office for follow up. Overall states he has been doing much better. Denies any straining or gross hematuria. Wakes up on average 1x a night to urinate. Feels that he is completely emptying his bladder most of the time. States that in the mornings when he first wakes up, he has to strain and that his stream is weaker. Otherwise, during the day his stream is better. He sometimes experiences splitting at that time as well. He rarely also experiences urinary urgency about 1-2x a day but does not have any accidents or leakage. This has not been bothersome to him.  PVR 30cc IPSS: 10

## 2025-04-02 NOTE — ASSESSMENT
[FreeTextEntry1] : 74 year old man with GG 1 prostate cancer diagnosed 12/2024, electing for active surveillance, BPH with 112 cc prostate, moderate LUTS with chronic retention. S/P HoLEP 2/6/25. Found to have bulbar urethral stricture during the procedure. Catheter left in place for 3 days. Since catheter removal, voiding with variable stream. He went into retention on 2/12/25, catheter replaced. TOV 2/14/25 was successful with improved PVR compared to prior. Since then, again had variable stream with occasional inability to urinate.  Cystoscopy 2/26/25 demonstrated recurrence of bulbar urethral stricture, very soft, but very narrow. The cystoscope was advanced over a wire through the structured area without any difficulty. Afterward, the area appeared more open. The prostate fossa is wide open with expected appearance after HoLEP. Patient voided several times without difficulty and with strong stream. Recurrence of stricture seen on cystoscopy 3/5/25, dilated with cystoscope. Plan for more formal dilation in the operating room. S/P urethral dilation with optilume balloon 3/6/25. Discharged with vasquez catheter. TOV in office 3/10/25 successful. Doing well since TOV mainly complaining of some urgency and weak stream in the AM. OTherwise, strong stream, more complete emptying. IPSS 13 and PVR 30cc   - F/U in 2 months for UA, IPSS, PVR, PSA

## 2025-07-30 NOTE — ASSESSMENT
[FreeTextEntry1] : 74 year old man with GG 1 prostate cancer diagnosed 12/2024, electing for active surveillance, BPH with 112 cc prostate, moderate LUTS with chronic retention. S/P HoLEP 2/6/25. Found to have bulbar urethral stricture during the procedure. Catheter left in place for 3 days. Since catheter removal, voiding with variable stream. He went into retention on 2/12/25, catheter replaced. TOV 2/14/25 was successful with improved PVR compared to prior. Since then, again had variable stream with occasional inability to urinate.  Cystoscopy 2/26/25 demonstrated recurrence of bulbar urethral stricture, very soft, but very narrow. The cystoscope was advanced over a wire through the structured area without any difficulty. Afterward, the area appeared more open. The prostate fossa is wide open with expected appearance after HoLEP. Patient voided several times without difficulty and with strong stream. Recurrence of stricture seen on cystoscopy 3/5/25, dilated with cystoscope. Plan for more formal dilation in the operating room. S/P urethral dilation with optilume balloon 3/6/25. Discharged with vasquez catheter. TOV in office 3/10/25 successful.  Doing well since urethral dilation with strong stream, complete emptying, no leakage, no urgency.  - F/U PSA, routine bloodwork also collected to review with his PCP  - Continue active surveillance  - F/U in 6 months